# Patient Record
Sex: FEMALE | Race: WHITE | NOT HISPANIC OR LATINO | URBAN - METROPOLITAN AREA
[De-identification: names, ages, dates, MRNs, and addresses within clinical notes are randomized per-mention and may not be internally consistent; named-entity substitution may affect disease eponyms.]

---

## 2023-11-21 DIAGNOSIS — F32.A DEPRESSION, UNSPECIFIED DEPRESSION TYPE: Primary | ICD-10-CM

## 2023-11-21 RX ORDER — ESCITALOPRAM OXALATE 10 MG/1
10 TABLET ORAL DAILY
Qty: 90 TABLET | Refills: 0 | Status: SHIPPED | OUTPATIENT
Start: 2023-11-21 | End: 2024-02-07

## 2023-11-27 ENCOUNTER — TELEPHONE (OUTPATIENT)
Dept: GASTROENTEROLOGY | Facility: HOSPITAL | Age: 68
End: 2023-11-27
Payer: COMMERCIAL

## 2023-11-27 DIAGNOSIS — R74.8 ELEVATED LIVER ENZYMES: Primary | ICD-10-CM

## 2023-11-27 NOTE — TELEPHONE ENCOUNTER
Hepatology Nurse Coordinator Note  Called patient to let her know that Dr. Flores would like her to get blood work completed prior to her upcoming follow up appointment. Patient states she is at [facility] [employer] now and would like the orders faxed to them at [phone]. Orders faxed. She verbalized understanding of plan and aware to call back if any questions, or concerns.     Update: Received fax receipt confirmation from [employer].

## 2023-11-27 NOTE — TELEPHONE ENCOUNTER
----- Message from Fabiano Flores MD sent at 11/27/2023 10:39 AM EST -----  Regarding: Call pt regarding labs prior to visit  Can you let this pt know that she can have LFT check done prior to her follow up visit with me. Order has been placed. Thanks

## 2023-12-01 ENCOUNTER — TELEPHONE (OUTPATIENT)
Dept: GASTROENTEROLOGY | Facility: HOSPITAL | Age: 68
End: 2023-12-01
Payer: COMMERCIAL

## 2023-12-06 ENCOUNTER — OFFICE VISIT (OUTPATIENT)
Dept: GASTROENTEROLOGY | Facility: HOSPITAL | Age: 68
End: 2023-12-06
Payer: COMMERCIAL

## 2023-12-06 VITALS
RESPIRATION RATE: 20 BRPM | TEMPERATURE: 97.6 F | BODY MASS INDEX: 31.24 KG/M2 | HEIGHT: 65 IN | OXYGEN SATURATION: 94 % | WEIGHT: 187.5 LBS | DIASTOLIC BLOOD PRESSURE: 74 MMHG | SYSTOLIC BLOOD PRESSURE: 120 MMHG | HEART RATE: 72 BPM

## 2023-12-06 DIAGNOSIS — R74.8 ELEVATED LIVER ENZYMES: Primary | ICD-10-CM

## 2023-12-06 DIAGNOSIS — K75.81 NASH (NONALCOHOLIC STEATOHEPATITIS): ICD-10-CM

## 2023-12-06 PROCEDURE — 1036F TOBACCO NON-USER: CPT | Performed by: STUDENT IN AN ORGANIZED HEALTH CARE EDUCATION/TRAINING PROGRAM

## 2023-12-06 PROCEDURE — 99214 OFFICE O/P EST MOD 30 MIN: CPT | Performed by: STUDENT IN AN ORGANIZED HEALTH CARE EDUCATION/TRAINING PROGRAM

## 2023-12-06 PROCEDURE — 1159F MED LIST DOCD IN RCRD: CPT | Performed by: STUDENT IN AN ORGANIZED HEALTH CARE EDUCATION/TRAINING PROGRAM

## 2023-12-06 PROCEDURE — 1126F AMNT PAIN NOTED NONE PRSNT: CPT | Performed by: STUDENT IN AN ORGANIZED HEALTH CARE EDUCATION/TRAINING PROGRAM

## 2023-12-06 PROCEDURE — 3008F BODY MASS INDEX DOCD: CPT | Performed by: STUDENT IN AN ORGANIZED HEALTH CARE EDUCATION/TRAINING PROGRAM

## 2023-12-06 RX ORDER — PANTOPRAZOLE SODIUM 40 MG/1
40 TABLET, DELAYED RELEASE ORAL DAILY
COMMUNITY
End: 2024-02-23

## 2023-12-06 RX ORDER — BENZONATATE 200 MG/1
200 CAPSULE ORAL 3 TIMES DAILY PRN
COMMUNITY
Start: 2023-05-02 | End: 2024-01-15 | Stop reason: ALTCHOICE

## 2023-12-06 RX ORDER — NICOTINE POLACRILEX 4 MG
LOZENGE BUCCAL
COMMUNITY
Start: 2023-05-02 | End: 2024-01-15 | Stop reason: ALTCHOICE

## 2023-12-06 RX ORDER — LOSARTAN POTASSIUM 25 MG/1
25 TABLET ORAL DAILY
COMMUNITY
End: 2024-02-07

## 2023-12-06 RX ORDER — LEVOTHYROXINE SODIUM 50 UG/1
1 TABLET ORAL DAILY
COMMUNITY
Start: 2013-12-10 | End: 2024-03-08

## 2023-12-06 RX ORDER — AZELASTINE 1 MG/ML
1 SPRAY, METERED NASAL 2 TIMES DAILY
COMMUNITY
Start: 2021-05-24 | End: 2024-06-10

## 2023-12-06 RX ORDER — FLUTICASONE PROPIONATE 50 MCG
SPRAY, SUSPENSION (ML) NASAL
COMMUNITY
Start: 2013-03-01

## 2023-12-06 RX ORDER — MONTELUKAST SODIUM 10 MG/1
1 TABLET ORAL NIGHTLY
COMMUNITY
Start: 2014-01-20 | End: 2024-01-15 | Stop reason: ALTCHOICE

## 2023-12-06 RX ORDER — VERAPAMIL HYDROCHLORIDE 240 MG/1
1 TABLET, FILM COATED, EXTENDED RELEASE ORAL DAILY
COMMUNITY
Start: 2006-06-15 | End: 2024-02-22

## 2023-12-06 ASSESSMENT — LIFESTYLE VARIABLES
HOW MANY STANDARD DRINKS CONTAINING ALCOHOL DO YOU HAVE ON A TYPICAL DAY: PATIENT DOES NOT DRINK
SKIP TO QUESTIONS 9-10: 1
HOW OFTEN DO YOU HAVE A DRINK CONTAINING ALCOHOL: NEVER
AUDIT-C TOTAL SCORE: 0
HOW OFTEN DO YOU HAVE SIX OR MORE DRINKS ON ONE OCCASION: NEVER

## 2023-12-06 ASSESSMENT — PAIN SCALES - GENERAL: PAINLEVEL: 0-NO PAIN

## 2023-12-06 ASSESSMENT — ENCOUNTER SYMPTOMS
DEPRESSION: 0
OCCASIONAL FEELINGS OF UNSTEADINESS: 0
LOSS OF SENSATION IN FEET: 0

## 2023-12-06 ASSESSMENT — PATIENT HEALTH QUESTIONNAIRE - PHQ9
SUM OF ALL RESPONSES TO PHQ9 QUESTIONS 1 AND 2: 0
2. FEELING DOWN, DEPRESSED OR HOPELESS: NOT AT ALL
1. LITTLE INTEREST OR PLEASURE IN DOING THINGS: NOT AT ALL

## 2023-12-06 NOTE — PATIENT INSTRUCTIONS
Fauzia Nelson,     Thank you for coming in for your hepatology office visit today. As per our discussion, I recommend:     Have labs done in end of March 2024.   Have a fibroscan done in April/May 2024. Call [phone].   Discuss with Dr Alejandro about starting medication for weight loss and fatty liver ds- since your liver tests are still elevated.     I would like to see you back in the office in May/June 2024.   If you are not provided with a date for your follow up visit at the end of your encounter today at the check out desk, I would encourage you to call [phone] to schedule your appointment with me.   If you have any trouble or need assistance with having this done, please reach out to my 's office at [phone] (Ms Joseline Mccloud) or my nurse coordinator at [phone] (Ms Glenis DAVIS).     I look forward to seeing you again. Thank you for allowing me to participate in your care.     Sincerely,  Fabiano Flores MD  Hepatology

## 2023-12-06 NOTE — Clinical Note
Dr Alejandro- NINO met with Ms Nelson yesterday. She is interested in exploring pharmacotherapy that would target weight loss and also help HANCOCK. Her LFTs remain modestly elevated and I do think she would benefit from the same. I have asked her to reach out to your office/schedule a visit with you to discuss this. Appreciate your help with this.

## 2023-12-07 PROBLEM — R74.8 ELEVATED LIVER ENZYMES: Status: ACTIVE | Noted: 2023-12-07

## 2023-12-07 PROBLEM — K75.81 NASH (NONALCOHOLIC STEATOHEPATITIS): Status: ACTIVE | Noted: 2023-12-07

## 2023-12-07 NOTE — PROGRESS NOTES
Hepatology: Follow up Office Note     Patient: Fauzia Nelson, a 68 y.o. year old female presents for follow up of elevated liver enzymes and steatotic liver disease.   PCP: Emory Alejandro DO    History of Present Illness   PMH: Hypertension, dyslipidemia, hypothyroidism.     Prior visit hx: Has had history of elevated LFTs for years and reports having prior follow up at [facility]. She switched insurance providers and as a result if switching health care providers as well. Initially there was a debate about her diagnosis between HANCOCK versus autoimmune hepatitis given positive VANESSA. She reportedly had improvement of liver enzymes on a prednisone trial and was subsequently transitioned to Imuran. Imuran however was later discontinued. She underwent a liver biopsy last year after she was off imuran. She notes being diagnosed with stage 2 fatty liver disease.      Today's visit: She feels well. Denies having symptoms- no RUQ abdominal pain, nausea, vomiting, itching, jaundice, abdominal pain, rashes. She notes trying to stay consistent with her food modifications. Restricting her caloric intake to 1200 kCal a day. Occasional bouts of low energy. Eats less red meats. Trying to eat more vegetable, chicken. Weight has been stable, but no weight loss.       PSH: Denies intra-abdominal surgical history  Social history: Denies history of alcohol, illicit substance or smoking.  Family history: No known history of chronic liver disease or hepatobiliary malignancy.  Medications: noted and reviewed    Review of Systems   Constitutional/ General: No fever, no night sweats, no weight loss  Eyes: no yellow discoloration  ENT: normal   Cardiovascular: no chest pain, no palpitations  Respiratory: no shortness of breath  Gastrointestinal: denies abdominal pain, nausea, vomiting  Integumentary: no rashes, no yellow discoloration of skin  Neurologic: no weakness or numbness of extremities  Psychiatric: no mood  fluctuations  Musculoskeletal: no joint swelling   Genitourinary: no dysuria, no hematuria    All other systems have been reviewed and are negative except as noted in the HPI and above.    Medications     Current Outpatient Medications   Medication Instructions    azelastine (Astelin) 137 mcg (0.1 %) nasal spray 1 spray, nasal, 2 times daily    benzonatate (TESSALON) 200 mg, oral, 3 times daily PRN    escitalopram (LEXAPRO) 10 mg, oral, Daily    fluticasone (Flonase) 50 mcg/actuation nasal spray USE 1 TO 2 SPRAYS IN EACH NOSTRIL ONE TIME DAILY    levothyroxine (Synthroid, Levoxyl) 50 mcg tablet 1 tablet, oral, Daily    losartan (COZAAR) 25 mg, oral, Daily    montelukast (Singulair) 10 mg tablet 1 tablet, oral, Nightly    pantoprazole (PROTONIX) 40 mg, oral, Daily    Tussin DM Day-Night 12.5 mg-30 mg/ 10 mL (night) liquid, sequential TAKE AS INSTRUCTED ON BOTTLE    verapamil SR (Calan-SR) 240 mg ER tablet 1 tablet, oral, Daily         Physical Examination     Vitals:    12/06/23 1554   BP: 120/74   Pulse: 72   Resp: 20   Temp: 36.4 °C (97.6 °F)   SpO2: 94%     Vitals:    12/06/23 1554   Weight: 85 kg (187 lb 8 oz)     Body mass index is 31.2 kg/m².  Constitutional: awake, alert   Eyes: EOMI, anicteric sclera  ENT: no oropharyngeal lesions visualized  Respiratory: Bilateral air entry equal no wheezing  Cardiovascular: regular rate and rhythm, no lower extremity edema  Abdomen: soft, non tender, non distended, bowel sounds present  Integumentary: no wounds on examined skin   Musculoskeletal: no joint swelling   Neurologic: gross motor strength intact  Psychiatric: alert, appropriate mood and affect, oriented to time/place/person    Labs     Lab Results   Component Value Date     03/02/2021    K 3.3 (L) 03/02/2021    CREATININE 0.98 03/02/2021    ALBUMIN 4.8 03/02/2021    ALT 72 (H) 03/02/2021    AST 43 (H) 03/02/2021    ALKPHOS 60 03/02/2021    INR 1.0 03/02/2021    HGB 16.3 (H) 03/02/2021     03/02/2021       Dec 2023: , AST 62, T bili 0.6, ALP 77  June 2023: ALT 79, AST 49, ALP 64, Bili 0.7, INR 1.1, Hb 15.9, WBC 5.7, Plt 261, ELF 9.51 (low)    Imaging   US liver June 2023: Hepatic steatosis.     HIE records  Labs: October/Sept 2022: INR 1.14, platelets 209, hemoglobin 15.1, sodium 136, creatinine 0.99, , AST 82, ALP 63, bilirubin 0.8, TSH 3.04  July 2021: AFP 2.3, ALT 77, AST 48, ALP 57, bilirubin 0.9  August 2020: ASMA negative, HCV antibody nonreactive, IgG 1270, VANESSA negative AMA negative, anti-LKM neg  2017: Ceruloplasmin 24, SSA antibody positive, ferritin 170, iron 70, percent saturation 25, A1 AT 83/low, hep B surface antibody 16.5, hep B surface antigen nonreactive, hep A total antibody nonreactive, hep B core total antibody nonreactive, hep C antibody nonreactive     October 2022: Ultrasound-guided liver biopsy: [facility]  Imaging:/Gallbladder/pancreas: July 2021: Mild hepatic steatosis, no focal suspicious hepatic lesion     Office note November 14, 2022: She presented for follow-up visit for management of HANCOCK. Liver biopsy was done in October 2022. She underwent a prednisone trial in the past and had VANESSA elevation of 1: 320 in the past. She was prescribed Imuran and advised to continue until further work-up. Subsequently Imuran was discontinued and autoimmune labs were negative.  Liver biopsy to stage fibrosis suspicious for HANCOCK with grade 1B fibrosis. Metabolic risk factors include obesity, dyslipidemia, hypertension, A1c normal, lipid panel only slightly abnormal, hypertension controlled. Continues to work on losing weight.  July 2021: FibroScan minimal fibrosis F2 with grade 3 steatosis. LSM 7.7K PA.     Liver biopsy report as documented in the HIE note: Moderate macrovesicular steatosis involving 50% hepatocytes with moderate lobular inflammation, ballooning degeneration hepatocytes and mild periportal and sinusoidal fibrosis. Fibrosis stage Ib, zone 3/perisinusoidal. Comment:  Overall features are suspicious for HANCOCK.    Assessment and Plan    Fauzia Nelson, a 68 y.o. year old female presents for follow up of HANCOCK/MASH. I have reviewed pertinent provider notes, labs and imaging studies. Discussed results and their interpretation with the patient today.    Encounter Diagnoses   Name Primary?    Elevated liver enzymes Yes    HANCOCK (nonalcoholic steatohepatitis)     Adult BMI 31.0-31.9 kg/sq m      Orders Placed This Encounter   Procedures    Hepatic Function Panel    CBC and Auto Differential    Liver Elastography (Fibroscan) GI      # Based on review of records available through [facility] at the time of her initial visit: she has been diagnosed with HANCOCK with stage 1b fibrosis on liver biopsy in Oct 2022. She has had ALT and AST elevations up to 4-6 xULN.   Prior to this, it was debated if she had a component of autoimmune hepatitis given positive VANESSA and a response to prednisone trial (based on chart documentation). The liver biopsy based on the report documented does not suggest feature of autoimmune liver ds.   She does have risk factors for metabolic liver disease; with diagnosis of HTN, dyslipidemia, abdominal adiposity.      # Today's visit: Continues to have modest transaminase elevations on LFTs, ALT predominant.   ELF score at the time of last visit predicted low risk for hepatic decompensation.   Given the persistent elevation of LFTs, discussed with pt about pursuing secondary therapy for MASH- including pharmacotherapy. Discussed options on how this can be done. Pt is interested in reviewing this with her PCP, Dr Alejandro. Pending his assessment and recs- pt will reach out to me. Also offered referral to endocrinology or [facility] program for discussion of pharmacotherapy for weight loss/MASH.     Discussed with patient pathophysiology of metabolism associated steatotic liver disease, MASLD/MASH. Discussed potential of disease progression to steatohepatitis, liver fibrosis, cirrhosis  and risk of HCC.     - Plan for LFT monitoring every 4 months.  - Plan for FibroSCAN in April/May 2024.   - Congratulated pt on maintaining her weight in a stable range over the past year. Discussed on continuing dietary modification. However, cautioned pt against restricting caloric intake to 0401-7688 kCal a day. Discussed that could be very restrictive and also add to fatigue etc. Counseled pt to liberalize her food intake to include 50% vegetables (and fiber from 1-2 servings of fruit), 30% lean protein and 20% carbs and fats.        - She has seen a nutritionist in July 2023.   - Pt advised to continue to avoid hepatotoxic agents.      Follow up in 6 months.

## 2024-01-04 ENCOUNTER — APPOINTMENT (OUTPATIENT)
Dept: PRIMARY CARE | Facility: CLINIC | Age: 69
End: 2024-01-04
Payer: COMMERCIAL

## 2024-01-15 ENCOUNTER — OFFICE VISIT (OUTPATIENT)
Dept: PRIMARY CARE | Facility: CLINIC | Age: 69
End: 2024-01-15
Payer: COMMERCIAL

## 2024-01-15 VITALS
HEIGHT: 65 IN | SYSTOLIC BLOOD PRESSURE: 136 MMHG | OXYGEN SATURATION: 97 % | TEMPERATURE: 97.8 F | WEIGHT: 192 LBS | HEART RATE: 74 BPM | DIASTOLIC BLOOD PRESSURE: 82 MMHG | BODY MASS INDEX: 31.99 KG/M2

## 2024-01-15 DIAGNOSIS — E66.9 OBESITY (BMI 30-39.9): ICD-10-CM

## 2024-01-15 DIAGNOSIS — E03.8 OTHER SPECIFIED HYPOTHYROIDISM: ICD-10-CM

## 2024-01-15 DIAGNOSIS — Z00.00 ENCOUNTER FOR HEALTH MAINTENANCE EXAMINATION: Primary | ICD-10-CM

## 2024-01-15 DIAGNOSIS — Z13.220 LIPID SCREENING: ICD-10-CM

## 2024-01-15 DIAGNOSIS — E03.9 HYPOTHYROIDISM, UNSPECIFIED TYPE: ICD-10-CM

## 2024-01-15 PROCEDURE — 3008F BODY MASS INDEX DOCD: CPT | Performed by: STUDENT IN AN ORGANIZED HEALTH CARE EDUCATION/TRAINING PROGRAM

## 2024-01-15 PROCEDURE — 1036F TOBACCO NON-USER: CPT | Performed by: STUDENT IN AN ORGANIZED HEALTH CARE EDUCATION/TRAINING PROGRAM

## 2024-01-15 PROCEDURE — 99214 OFFICE O/P EST MOD 30 MIN: CPT | Performed by: STUDENT IN AN ORGANIZED HEALTH CARE EDUCATION/TRAINING PROGRAM

## 2024-01-15 PROCEDURE — 1126F AMNT PAIN NOTED NONE PRSNT: CPT | Performed by: STUDENT IN AN ORGANIZED HEALTH CARE EDUCATION/TRAINING PROGRAM

## 2024-01-15 PROCEDURE — 1159F MED LIST DOCD IN RCRD: CPT | Performed by: STUDENT IN AN ORGANIZED HEALTH CARE EDUCATION/TRAINING PROGRAM

## 2024-01-15 NOTE — PROGRESS NOTES
"Subjective   Patient ID: Fuazia Nelson is a 68 y.o. female who presents for Weight Loss (Discuss weight loss meds and review labs/discuss labs).    HPI  HANCOCK  History of HANCOCK  Follows with hepatology and GI  Does not have history of diabetes  Was sent to PCP by GI for weight loss medication      Review of Systems  All pertinent positive symptoms are included in the history of present illness.    All other systems have been reviewed and are negative and noncontributory to this patient's current ailments.     Allergies   Allergen Reactions    Butalbital-Acetaminop-Caf-Cod Nausea And Vomiting and Nausea Only    Codeine GI Upset, Other and Unknown    Lisinopril Cough    Conrad Hives     Penicillin    Penicillins Hives, Nausea And Vomiting, Nausea Only, Rash and Unknown    Sulfa (Sulfonamide Antibiotics) Hives and Rash        Immunization History   Administered Date(s) Administered    Pfizer Purple Cap SARS-CoV-2 03/10/2021, 04/01/2021, 12/22/2021       Objective   Vitals:    01/15/24 1312   BP: 136/82   Pulse: 74   Temp: 36.6 °C (97.8 °F)   SpO2: 97%   Weight: 87.1 kg (192 lb)   Height: 1.651 m (5' 5\")       Physical Exam  CONSTITUTIONAL - well nourished, well developed, looks like stated age, in no acute distress, not ill-appearing, and not tired appearing  SKIN - normal skin color and pigmentation, normal skin turgor without rash, lesions, or nodules visualized  HEAD - no trauma, normocephalic  EYES - normal external exam  NECK - supple without rigidity, no neck mass was observed, no thyromegaly or thyroid nodules  CHEST - clear to auscultation, no wheezing, no crackles and no rales, good effort  CARDIAC - regular rate and regular rhythm, no skipped beats, no murmur  EXTREMITIES - no edema, no deformities  NEUROLOGICAL - normal gait, normal balance, normal motor, no ataxia, alert, oriented and no focal signs  PSYCHIATRIC - alert, pleasant and cordial, age-appropriate  IMMUNOLOGIC - no cervical lymphadenopathy "     Assessment/Plan   HANCOCK  Continue healthy diet and exercise  Due to medication shortages and lack of insulin resistance, there will be difficulty getting GLP-1s covered

## 2024-02-07 DIAGNOSIS — F32.A DEPRESSION, UNSPECIFIED DEPRESSION TYPE: ICD-10-CM

## 2024-02-07 DIAGNOSIS — I10 BENIGN ESSENTIAL HTN: Primary | ICD-10-CM

## 2024-02-07 RX ORDER — ESCITALOPRAM OXALATE 10 MG/1
10 TABLET ORAL DAILY
Qty: 90 TABLET | Refills: 1 | Status: SHIPPED | OUTPATIENT
Start: 2024-02-07 | End: 2024-06-03

## 2024-02-07 RX ORDER — LOSARTAN POTASSIUM 25 MG/1
25 TABLET ORAL DAILY
Qty: 90 TABLET | Refills: 1 | Status: SHIPPED | OUTPATIENT
Start: 2024-02-07 | End: 2024-06-03

## 2024-02-22 DIAGNOSIS — I10 HYPERTENSION, UNSPECIFIED TYPE: Primary | ICD-10-CM

## 2024-02-22 RX ORDER — VERAPAMIL HYDROCHLORIDE 240 MG/1
240 TABLET, FILM COATED, EXTENDED RELEASE ORAL DAILY
Qty: 90 TABLET | Refills: 3 | Status: SHIPPED | OUTPATIENT
Start: 2024-02-22

## 2024-02-23 DIAGNOSIS — K21.9 GASTROESOPHAGEAL REFLUX DISEASE, UNSPECIFIED WHETHER ESOPHAGITIS PRESENT: Primary | ICD-10-CM

## 2024-02-23 DIAGNOSIS — T78.40XS ALLERGY, SEQUELA: ICD-10-CM

## 2024-02-23 RX ORDER — PANTOPRAZOLE SODIUM 40 MG/1
40 TABLET, DELAYED RELEASE ORAL DAILY
Qty: 90 TABLET | Refills: 3 | Status: SHIPPED | OUTPATIENT
Start: 2024-02-23

## 2024-02-23 RX ORDER — MONTELUKAST SODIUM 10 MG/1
10 TABLET ORAL DAILY
Qty: 90 TABLET | Refills: 3 | Status: SHIPPED | OUTPATIENT
Start: 2024-02-23

## 2024-03-06 ENCOUNTER — CLINICAL SUPPORT (OUTPATIENT)
Dept: GASTROENTEROLOGY | Facility: HOSPITAL | Age: 69
End: 2024-03-06
Payer: COMMERCIAL

## 2024-03-06 DIAGNOSIS — R74.8 ELEVATED LIVER ENZYMES: ICD-10-CM

## 2024-03-06 DIAGNOSIS — K75.81 NASH (NONALCOHOLIC STEATOHEPATITIS): ICD-10-CM

## 2024-03-06 PROCEDURE — 91200 LIVER ELASTOGRAPHY: CPT | Performed by: STUDENT IN AN ORGANIZED HEALTH CARE EDUCATION/TRAINING PROGRAM

## 2024-03-08 DIAGNOSIS — E03.9 HYPOTHYROIDISM, UNSPECIFIED TYPE: Primary | ICD-10-CM

## 2024-03-08 RX ORDER — LEVOTHYROXINE SODIUM 50 UG/1
50 TABLET ORAL DAILY
Qty: 90 TABLET | Refills: 2 | Status: SHIPPED | OUTPATIENT
Start: 2024-03-08

## 2024-03-15 ENCOUNTER — TELEPHONE (OUTPATIENT)
Dept: GASTROENTEROLOGY | Facility: CLINIC | Age: 69
End: 2024-03-15
Payer: COMMERCIAL

## 2024-03-15 NOTE — TELEPHONE ENCOUNTER
Addendum 3/15/2024 3:37 PM  Called patient to provide results, as well as to return call regarding questions patient had (regarding lab orders and fibroscan results). Patient was provided Fibroscan results, as well as education on the FibroSCAN fibrosis and steatosis scoring. Patient is aware her fibroscan predicted stage 2 fibrosis and grade 1 steatosis. She's aware this is stable in comparison to her fibroscan in 2021.     Patient is aware she needs to get her blood work done. Patient is requesting for orders to be mailed to her as she needs to go to [place]. Verified mailing address.     Asked patient if she had discussed pharmacotherapy for weight management with her PCP, which would be beneficial for HANCOCK. Patient states she spoke with Dr. Martinez and is considering either Wegovy, Ozempic, or Mounjaro, but she hasn't decided yet. She's aware to follow up with her PCP for orders, once she has decided. She verbalized understanding. Will update Dr. Flores on status.    Patient verbalized understanding of results and recommendations.      ----- Message from Fabiano Flores MD sent at 3/7/2024 10:45 AM EST -----  Please advise the pt that Fibroscan predicts Stage 2 fibrosis and grade 1 steatosis. The fibrosis result is stable in comparison to her prior fibroscan in 2021. Pt will be due for labs for me this month, orders are in from the office visit in Dec. In addition would check if she had a discussion with her PCP regarding pharmacotherapy for weight mgmt which would be beneficial for HANCOCK as well. Thanks

## 2024-04-18 ENCOUNTER — TELEPHONE (OUTPATIENT)
Dept: PRIMARY CARE | Facility: CLINIC | Age: 69
End: 2024-04-18
Payer: COMMERCIAL

## 2024-04-18 DIAGNOSIS — Z12.11 COLON CANCER SCREENING: Primary | ICD-10-CM

## 2024-04-18 NOTE — TELEPHONE ENCOUNTER
Patient called stating that one of her old doctors at [facility] had written for her to have a cologuard screening.  She recently did it and it came back positive.  Do you want her to come in to see you to have you write the order for a colonoscopy?  Or would you be okay with writing an order for a colonoscopy for her?

## 2024-05-20 NOTE — PROGRESS NOTES
Hepatology: Follow up Office Note     Patient: Fauzia Nelson, a 68 y.o. year old female presents for follow up of elevated liver enzymes and steatotic liver disease.   PCP: Emory Alejandro DO    History of Present Illness   PMH: Hypertension, dyslipidemia, hypothyroidism.     Prior visit hx: Has had history of elevated LFTs for years and reports having prior follow up at [facility]. She switched insurance providers and as a result if switching health care providers as well. Initially there was a debate about her diagnosis between HANCOCK versus autoimmune hepatitis given positive VANESSA. She reportedly had improvement of liver enzymes on a prednisone trial and was subsequently transitioned to Imuran. Imuran however was later discontinued. She underwent a liver biopsy last year after she was off imuran. She notes being diagnosed with stage 2 fatty liver disease.      Today's visit: Here for a follow up visit. Denies acute symptoms. Feels well. Continues to adhere to nutritional modifications for hx of fatty liver ds. She feels well. Denies having symptoms- no RUQ abdominal pain, nausea, vomiting, itching, jaundice, abdominal pain, rashes. She notes trying to stay consistent with her food modifications. Restricting her caloric intake to 1200 kCal a day. Occasional bouts of low energy. Eats less red meats. Trying to eat more vegetable, chicken. Weight has been stable, but no weight loss.       PSH: Denies intra-abdominal surgical history  Social history: Denies history of alcohol, illicit substance or smoking.  Family history: No known history of chronic liver disease or hepatobiliary malignancy.  Medications: noted and reviewed    Review of Systems   Constitutional/ General: No fever, no night sweats, no weight loss  Eyes: no yellow discoloration  ENT: normal   Cardiovascular: no chest pain, no palpitations  Respiratory: no shortness of breath  Gastrointestinal: denies abdominal pain, nausea, vomiting  Integumentary: no  rashes, no yellow discoloration of skin  Neurologic: no weakness or numbness of extremities  Psychiatric: no mood fluctuations  Musculoskeletal: no joint swelling   Genitourinary: no dysuria, no hematuria    All other systems have been reviewed and are negative except as noted in the HPI and above.    Medications     Current Outpatient Medications   Medication Instructions    azelastine (Astelin) 137 mcg (0.1 %) nasal spray 1 spray, nasal, 2 times daily    escitalopram (LEXAPRO) 10 mg, oral, Daily    fluticasone (Flonase) 50 mcg/actuation nasal spray USE 1 TO 2 SPRAYS IN EACH NOSTRIL ONE TIME DAILY    levothyroxine (SYNTHROID, LEVOXYL) 50 mcg, oral, Daily    losartan (COZAAR) 25 mg, oral, Daily    montelukast (SINGULAIR) 10 mg, oral, Daily    pantoprazole (PROTONIX) 40 mg, oral, Daily    verapamil SR (CALAN-SR) 240 mg, oral, Daily         Physical Examination     Vitals:    05/22/24 1526   BP: 100/64   Pulse: 72   Resp: 18   Temp: 36.5 °C (97.7 °F)   SpO2: 94%     Vitals:    05/22/24 1526   Weight: 85.7 kg (189 lb)     Body mass index is 31.45 kg/m².    Constitutional: awake, alert   Eyes: EOMI, anicteric sclera  ENT: no oropharyngeal lesions visualized  Respiratory: Bilateral air entry equal no wheezing  Cardiovascular: regular rate and rhythm, no lower extremity edema  Abdomen: soft, non tender, non distended, bowel sounds present  Integumentary: no wounds on examined skin   Musculoskeletal: no joint swelling   Neurologic: gross motor strength intact  Psychiatric: alert, appropriate mood and affect, oriented to time/place/person    Labs     March 2024: ALT 65, AST 42  Dec 2023: , AST 62, T bili 0.6, ALP 77  June 2023: ALT 79, AST 49, ALP 64, Bili 0.7, INR 1.1, Hb 15.9, WBC 5.7, Plt 261, ELF 9.51 (low)    Lab Results   Component Value Date     03/02/2021    K 3.3 (L) 03/02/2021    CREATININE 0.98 03/02/2021    ALBUMIN 4.8 03/02/2021    ALT 72 (H) 03/02/2021    AST 43 (H) 03/02/2021    ALKPHOS 60  03/02/2021    INR 1.0 03/02/2021    HGB 16.3 (H) 03/02/2021     03/02/2021      Imaging   Fibroscan March 2024: E 7.7 kPa, IQR 17%, . F2, S1.     US liver June 2023: Hepatic steatosis.     HIE records  Labs: October/Sept 2022: INR 1.14, platelets 209, hemoglobin 15.1, sodium 136, creatinine 0.99, , AST 82, ALP 63, bilirubin 0.8, TSH 3.04  July 2021: AFP 2.3, ALT 77, AST 48, ALP 57, bilirubin 0.9  August 2020: ASMA negative, HCV antibody nonreactive, IgG 1270, VANESSA negative AMA negative, anti-LKM neg  2017: Ceruloplasmin 24, SSA antibody positive, ferritin 170, iron 70, percent saturation 25, A1 AT 83/low, hep B surface antibody 16.5, hep B surface antigen nonreactive, hep A total antibody nonreactive, hep B core total antibody nonreactive, hep C antibody nonreactive     October 2022: Ultrasound-guided liver biopsy: [facility]  Imaging:/Gallbladder/pancreas: July 2021: Mild hepatic steatosis, no focal suspicious hepatic lesion     Office note November 14, 2022: She presented for follow-up visit for management of HANCOCK. Liver biopsy was done in October 2022. She underwent a prednisone trial in the past and had VANESSA elevation of 1: 320 in the past. She was prescribed Imuran and advised to continue until further work-up. Subsequently Imuran was discontinued and autoimmune labs were negative.  Liver biopsy to stage fibrosis suspicious for HANCOCK with grade 1B fibrosis. Metabolic risk factors include obesity, dyslipidemia, hypertension, A1c normal, lipid panel only slightly abnormal, hypertension controlled. Continues to work on losing weight.  July 2021: FibroScan minimal fibrosis F2 with grade 3 steatosis. LSM 7.7K PA.     Liver biopsy report as documented in the HIE note: Moderate macrovesicular steatosis involving 50% hepatocytes with moderate lobular inflammation, ballooning degeneration hepatocytes and mild periportal and sinusoidal fibrosis. Fibrosis stage Ib, zone 3/perisinusoidal. Comment:  Overall features are suspicious for HANCOCK.    Assessment and Plan    Fauzia Nelson, a 68 y.o. year old female presents for follow up of HANCCOK/MASH. I have reviewed pertinent provider notes, labs and imaging studies. Discussed results and their interpretation with the patient today.    Encounter Diagnoses   Name Primary?    HANCOCK (nonalcoholic steatohepatitis) Yes    Elevated liver enzymes     Adult BMI 31.0-31.9 kg/sq m        Orders Placed This Encounter   Procedures    US abdomen limited liver    CBC and Auto Differential    Basic Metabolic Panel    Hepatic Function Panel    Protime-INR    Enhanced Liver Fibrosis Test (ELF); [facility]; [id number] - Miscellaneous Test      # [facility] stage 1b fibrosis based on liver bx in Oct 2022.   [Based on review of records available through [facility] at the time of her initial visit: she has been diagnosed with HANCOCK with stage 1b fibrosis on liver biopsy in Oct 2022. She has had ALT and AST elevations up to 4-6 xULN. Prior to this, it was debated if she had a component of autoimmune hepatitis given positive VANESSA and a response to prednisone trial (based on chart documentation). The liver biopsy based on the report documented does not suggest feature of autoimmune liver ds.]  Cardio metabolic risk factors present: HTN, dyslipidemia, abdominal adiposity, elevated BMI.    -Subsequent NIT with VCE estimates stage II disease for MASLD. The degree of LSM is stable on recent FibroSCAN.   -Continued elevation in liver enzymes, degree of elevations are stable. But this continues to suggest disease activity.     With stage II disease/fibrosis: recommend pharmacotherapy- Resmetirom. Discussed details of this pharmacotherapy with the pt today.   Pt is interested in reviewing this- provided information for the drug for patient to review.       - Plan for labs now- including LFT, CBC, BMP, INR and ELF.   - Recommend US liver to monitor hepatic morphology.   - Pending labs will discuss if pt is agreeable to  move forward with trial of Resmetirom. Short term follow up in 3 months.   - She has seen a nutritionist in July 2023.   - Pt advised to continue to avoid hepatotoxic agents.      Follow up in 3 months.

## 2024-05-22 ENCOUNTER — OFFICE VISIT (OUTPATIENT)
Dept: GASTROENTEROLOGY | Facility: HOSPITAL | Age: 69
End: 2024-05-22
Payer: COMMERCIAL

## 2024-05-22 VITALS
OXYGEN SATURATION: 94 % | HEART RATE: 72 BPM | DIASTOLIC BLOOD PRESSURE: 64 MMHG | BODY MASS INDEX: 31.45 KG/M2 | WEIGHT: 189 LBS | SYSTOLIC BLOOD PRESSURE: 100 MMHG | TEMPERATURE: 97.7 F | RESPIRATION RATE: 18 BRPM

## 2024-05-22 DIAGNOSIS — K75.81 NASH (NONALCOHOLIC STEATOHEPATITIS): Primary | ICD-10-CM

## 2024-05-22 DIAGNOSIS — R74.8 ELEVATED LIVER ENZYMES: ICD-10-CM

## 2024-05-22 PROCEDURE — 1126F AMNT PAIN NOTED NONE PRSNT: CPT | Performed by: STUDENT IN AN ORGANIZED HEALTH CARE EDUCATION/TRAINING PROGRAM

## 2024-05-22 PROCEDURE — 1036F TOBACCO NON-USER: CPT | Performed by: STUDENT IN AN ORGANIZED HEALTH CARE EDUCATION/TRAINING PROGRAM

## 2024-05-22 PROCEDURE — 99213 OFFICE O/P EST LOW 20 MIN: CPT | Performed by: STUDENT IN AN ORGANIZED HEALTH CARE EDUCATION/TRAINING PROGRAM

## 2024-05-22 PROCEDURE — 3008F BODY MASS INDEX DOCD: CPT | Performed by: STUDENT IN AN ORGANIZED HEALTH CARE EDUCATION/TRAINING PROGRAM

## 2024-05-22 PROCEDURE — 1159F MED LIST DOCD IN RCRD: CPT | Performed by: STUDENT IN AN ORGANIZED HEALTH CARE EDUCATION/TRAINING PROGRAM

## 2024-05-22 ASSESSMENT — PAIN SCALES - GENERAL: PAINLEVEL: 0-NO PAIN

## 2024-05-22 NOTE — PATIENT INSTRUCTIONS
Fauzia Nelson,     Thank you for coming in for your hepatology office visit today. As per our discussion, I recommend:     Have labs done.  Have an ultrasound of the liver done, call [phone] to schedule.   You can read more about the new medication for fatty liver here- [email or web address]    I would like to see you back in the office in 3 months.   If you are not provided with a date for your follow up visit at the end of your encounter today at the check out desk, I would encourage you to call [phone] to schedule your appointment with me.   If you have any trouble or need assistance with having this done, please reach out to my 's office at [phone] (Ms Joseline Mccloud) or my nurse coordinator at [phone] (Ms Glenis DAVIS).     I look forward to seeing you again. Thank you for allowing me to participate in your care.     Sincerely,  Fabiano Flores MD  Hepatology

## 2024-05-31 DIAGNOSIS — F32.A DEPRESSION, UNSPECIFIED DEPRESSION TYPE: ICD-10-CM

## 2024-05-31 DIAGNOSIS — I10 BENIGN ESSENTIAL HTN: ICD-10-CM

## 2024-06-03 RX ORDER — LOSARTAN POTASSIUM 25 MG/1
25 TABLET ORAL DAILY
Qty: 90 TABLET | Refills: 1 | Status: SHIPPED | OUTPATIENT
Start: 2024-06-03

## 2024-06-03 RX ORDER — ESCITALOPRAM OXALATE 10 MG/1
10 TABLET ORAL DAILY
Qty: 90 TABLET | Refills: 1 | Status: SHIPPED | OUTPATIENT
Start: 2024-06-03

## 2024-06-08 DIAGNOSIS — J30.2 SEASONAL ALLERGIES: Primary | ICD-10-CM

## 2024-06-10 ENCOUNTER — APPOINTMENT (OUTPATIENT)
Dept: RADIOLOGY | Facility: HOSPITAL | Age: 69
End: 2024-06-10
Payer: COMMERCIAL

## 2024-06-10 RX ORDER — AZELASTINE 1 MG/ML
SPRAY, METERED NASAL
Qty: 30 ML | Refills: 1 | Status: SHIPPED | OUTPATIENT
Start: 2024-06-10 | End: 2024-06-12

## 2024-06-12 ENCOUNTER — TELEPHONE (OUTPATIENT)
Dept: PRIMARY CARE | Facility: CLINIC | Age: 69
End: 2024-06-12
Payer: COMMERCIAL

## 2024-06-12 ENCOUNTER — HOSPITAL ENCOUNTER (OUTPATIENT)
Dept: RADIOLOGY | Facility: HOSPITAL | Age: 69
Discharge: HOME | End: 2024-06-12
Payer: COMMERCIAL

## 2024-06-12 DIAGNOSIS — K75.81 NASH (NONALCOHOLIC STEATOHEPATITIS): ICD-10-CM

## 2024-06-12 DIAGNOSIS — J30.2 SEASONAL ALLERGIES: ICD-10-CM

## 2024-06-12 PROCEDURE — 76705 ECHO EXAM OF ABDOMEN: CPT

## 2024-06-12 RX ORDER — AZELASTINE 1 MG/ML
SPRAY, METERED NASAL
Qty: 30 ML | Refills: 4 | Status: SHIPPED | OUTPATIENT
Start: 2024-06-12 | End: 2024-06-13 | Stop reason: SDUPTHER

## 2024-06-13 DIAGNOSIS — T78.40XS ALLERGY, SEQUELA: Primary | ICD-10-CM

## 2024-06-13 DIAGNOSIS — J30.2 SEASONAL ALLERGIES: ICD-10-CM

## 2024-06-13 RX ORDER — AZELASTINE 1 MG/ML
2 SPRAY, METERED NASAL 2 TIMES DAILY
Qty: 30 ML | Refills: 2 | Status: SHIPPED | OUTPATIENT
Start: 2024-06-13

## 2024-06-13 RX ORDER — AZELASTINE 1 MG/ML
SPRAY, METERED NASAL
Qty: 30 ML | Refills: 4 | Status: CANCELLED | OUTPATIENT
Start: 2024-06-13

## 2024-08-01 ENCOUNTER — TELEPHONE (OUTPATIENT)
Dept: PRIMARY CARE | Facility: CLINIC | Age: 69
End: 2024-08-01

## 2024-08-01 ENCOUNTER — OFFICE VISIT (OUTPATIENT)
Dept: PRIMARY CARE | Facility: CLINIC | Age: 69
End: 2024-08-01
Payer: COMMERCIAL

## 2024-08-01 VITALS
WEIGHT: 191 LBS | OXYGEN SATURATION: 97 % | HEIGHT: 65 IN | SYSTOLIC BLOOD PRESSURE: 118 MMHG | DIASTOLIC BLOOD PRESSURE: 78 MMHG | HEART RATE: 94 BPM | BODY MASS INDEX: 31.82 KG/M2 | TEMPERATURE: 98.8 F

## 2024-08-01 DIAGNOSIS — H61.21 IMPACTED CERUMEN OF RIGHT EAR: ICD-10-CM

## 2024-08-01 DIAGNOSIS — J02.9 SORE THROAT: Primary | ICD-10-CM

## 2024-08-01 LAB — POC GROUP A STREP, PCR: NOT DETECTED

## 2024-08-01 RX ORDER — DOXYCYCLINE 100 MG/1
100 CAPSULE ORAL 2 TIMES DAILY
Qty: 14 CAPSULE | Refills: 0 | Status: SHIPPED | OUTPATIENT
Start: 2024-08-01 | End: 2024-08-08

## 2024-08-01 ASSESSMENT — PATIENT HEALTH QUESTIONNAIRE - PHQ9
2. FEELING DOWN, DEPRESSED OR HOPELESS: NOT AT ALL
SUM OF ALL RESPONSES TO PHQ9 QUESTIONS 1 AND 2: 0
1. LITTLE INTEREST OR PLEASURE IN DOING THINGS: NOT AT ALL

## 2024-08-01 NOTE — PROGRESS NOTES
"Subjective   Patient ID: Fauzia Nelson is a 68 y.o. female who presents for No chief complaint on file..  HPI  She is 68 year old female with PMH allergies, hypothyroidism and depression who presented in the office with one week of sore throat especially the left side more then the right side, and dry cough and hard time swallow.  Patient has some tearing from the eyes but endorse that that is not a new symptoms for her. She saw an ophthalmologist who prescribed new glasses and there were suppose to come tomorrow. No sick contact that she noticed.   She has been using her allergy medication with a little relieve but said that overall her symptoms got worse over the course of the week.     Denies new onset sneezing,no ear discharge, ear clogged, headaches, running nose, fever, chills, n/v/d, chest pain, SOB, abdominal pain, urinary symptoms, and lower extremity edema.     Review of Systems  All other systems have been reviewed and are negative.    Visit Vitals  /78   Pulse 94   Temp 37.1 °C (98.8 °F)   Ht 1.651 m (5' 5\")   Wt 86.6 kg (191 lb)   SpO2 97%   BMI 31.78 kg/m²   Smoking Status Never   BSA 1.99 m²       Objective   Physical Exam  General: Alert and oriented. Appears well-nourished and in no acute distress.  Eyes: PERRLA. EOMI.  Head/neck: Normocephalic. Supple.  Nose-erythema of nasal mucosa without discharge.   Ear- right external canal was normal but TM was not able to see due to wax   Left ear was normal external canal and TM.  Mouth-no sore on the mouth, erythemal of the pharyng without exudate. No block of the air way.  Lymphatics: left side cervical lymphadenopathy tender to touch  Respiratory/Thorax: coarse breathing sound on the upper part of the airway. No wheezing or rale.   Cardiovascular: Regular rate and rhythm. No murmurs.  Gastrointestinal: Soft, nontender, nondistended. +BS   Musculoskeletal: ROM intact. No joint swelling. Normal strength   Extremities: Warm and well perfused. No " peripheral edema.  Neurological: No gross neurologic deficits.   Psychological: Appropriate mood and affect.   Skin: No visible rashes or lesions.     Assessment/Plan   She is 68 year old female with PMH of hypothyroidism, allergies, depression who came to the office complaining of sore throat and cough for the past week. Patient mention that the symptoms got worse during this week.     #upper respiratory infection   -rapid strep test was negative   -order doxycycline 100 mg BID  -patient was advised about the side effect of the medication       # right ear wax  -order debrox drop  for 5 days    No red flags. Thank you for letting me be part of the team today.   Follow up in 10-14 days for evaluation of the upper respiratory infection and clean her ears. Let us know if the symptoms get worse.        Problem List Items Addressed This Visit    None  Visit Diagnoses       Sore throat    -  Primary    Relevant Medications    doxycycline (Vibramycin) 100 mg capsule    Other Relevant Orders    POCT Group A Streptococcus, PCR manually resulted (Completed)    Impacted cerumen of right ear        Relevant Medications    carbamide peroxide (Debrox) 6.5 % otic solution            I have personally reviewed all available pertinent labs, imaging, and consult notes with the patient.     All questions and concerns were addressed. Patient verbalizes understanding instructions and agrees with established plan of care.     I discussed the plan with Dr.Zen Yesy Crane MD  Family medicine resident  PGY3

## 2024-08-01 NOTE — PROGRESS NOTES
"Subjective   Patient ID: Fauzia Nelson is a 68 y.o. female who presents for No chief complaint on file..  HPI        Denies new onset headaches, fever, chills, n/v/d, chest pain, SOB, abdominal pain, urinary symptoms, and lower extremity edema.     Review of Systems  All other systems have been reviewed and are negative.    Visit Vitals  /78   Pulse 110   Temp 37.1 °C (98.8 °F)   Ht 1.651 m (5' 5\")   Wt 86.6 kg (191 lb)   SpO2 97%   BMI 31.78 kg/m²   Smoking Status Never   BSA 1.99 m²       Objective   Physical Exam  General: Alert and oriented. Appears well-nourished and in no acute distress.  Eyes: PERRLA. EOMI.  Head/neck: Normocephalic. Supple.  Lymphatics: No cervical lymphadenopathy.  Respiratory/Thorax: Clear to auscultation bilaterally. No wheezing.   Cardiovascular: Regular rate and rhythm. No murmurs.  Gastrointestinal: Soft, nontender, nondistended. +BS   Musculoskeletal: ROM intact. No joint swelling. Normal strength   Extremities: Warm and well perfused. No peripheral edema.  Neurological: No gross neurologic deficits.   Psychological: Appropriate mood and affect.   Skin: No visible rashes or lesions.     Assessment/Plan         No red flags. Follow up in      Problem List Items Addressed This Visit    None  Visit Diagnoses       Sore throat    -  Primary    Relevant Orders    POCT Group A Streptococcus, PCR manually resulted            I have personally reviewed all available pertinent labs, imaging, and consult notes with the patient.     All questions and concerns were addressed. Patient verbalizes understanding instructions and agrees with established plan of care.     I discussed the plan with Dr.Zen Yesy Crane MD  Family medicine resident  PGY1  "

## 2024-08-15 ENCOUNTER — APPOINTMENT (OUTPATIENT)
Dept: PRIMARY CARE | Facility: CLINIC | Age: 69
End: 2024-08-15
Payer: COMMERCIAL

## 2024-08-15 VITALS
BODY MASS INDEX: 29.99 KG/M2 | WEIGHT: 180 LBS | HEART RATE: 74 BPM | HEIGHT: 65 IN | OXYGEN SATURATION: 99 % | TEMPERATURE: 98.7 F | SYSTOLIC BLOOD PRESSURE: 128 MMHG | DIASTOLIC BLOOD PRESSURE: 80 MMHG

## 2024-08-15 DIAGNOSIS — T49.6X5A: Primary | ICD-10-CM

## 2024-08-15 RX ORDER — TOBRAMYCIN AND DEXAMETHASONE 3; 1 MG/G; MG/G
0.5 OINTMENT OPHTHALMIC 3 TIMES DAILY
Qty: 8 G | Refills: 0 | Status: SHIPPED | OUTPATIENT
Start: 2024-08-15 | End: 2024-08-20

## 2024-08-15 ASSESSMENT — PATIENT HEALTH QUESTIONNAIRE - PHQ9
1. LITTLE INTEREST OR PLEASURE IN DOING THINGS: NOT AT ALL
SUM OF ALL RESPONSES TO PHQ9 QUESTIONS 1 AND 2: 0
2. FEELING DOWN, DEPRESSED OR HOPELESS: NOT AT ALL

## 2024-08-15 NOTE — PROGRESS NOTES
"Subjective   Patient ID: Fauzia Nelson is a 68 y.o. female who presents for Follow-up.  HPI  She is 68 years old female came today to the office for wax removed.  She reports feeling better after she was treated with doxycycline for sore throat.  Some upset stomach were noted with medication but she is feeling better.     Denies new onset headaches, fever, chills, n/v/d, chest pain, SOB, abdominal pain, urinary symptoms, and lower extremity edema.     Review of Systems  All other systems have been reviewed and are negative.    Visit Vitals  /80   Pulse 74   Temp 37.1 °C (98.7 °F)   Ht 1.651 m (5' 5\")   Wt 81.6 kg (180 lb)   SpO2 99%   BMI 29.95 kg/m²   Smoking Status Never   BSA 1.93 m²       Objective   Physical Exam  General: Alert and oriented. Appears well-nourished and in no acute distress.  Eyes: PERRLA. EOMI.  Ears-right tympanic membrane was not visualized due to the wax.  Left tympanic membrane was clear.   Head/neck: Normocephalic. Supple.  Lymphatics: No cervical lymphadenopathy.  Respiratory/Thorax: Clear to auscultation bilaterally. No wheezing.   Cardiovascular: Regular rate and rhythm. No murmurs.  Gastrointestinal: Soft, nontender, nondistended. +BS   Musculoskeletal: ROM intact. No joint swelling. Normal strength   Extremities: Warm and well perfused. No peripheral edema.  Neurological: No gross neurologic deficits.   Psychological: Appropriate mood and affect.   Skin: No visible rashes or lesions.     Assessment/Plan   She is 68 years old female came today to the office for earwax removal.    # Earwax  -Wax was removed with warm irrigation.  Tympanic membrane was normal color after the removal of wax.  -TobraDex was prescribed.       No red flags. Follow up in 1 year for health maintenance or sooner if needed.    Problem List Items Addressed This Visit    None  Visit Diagnoses       Adverse effect of ear wax removal preparation    -  Primary    Relevant Medications    tobramycin-dexamethasone " (Tobradex) ophthalmic ointment            I have personally reviewed all available pertinent labs, imaging, and consult notes with the patient.     All questions and concerns were addressed. Patient verbalizes understanding instructions and agrees with established plan of care.     I discussed the plan with Dr.Zen Ysey Crane MD  Family medicine resident  PGY1

## 2024-08-15 NOTE — PROGRESS NOTES
Subjective   Fauzia Nelson is a 68 y.o. female whom I am asked to see for evaluation of diminished hearing in the right ear for the past 10 day. There is not a prior history of cerumen impaction. The patient has been using ear drops to loosen wax immediately prior to this visit. The patient denies ear pain.    Objective   Auditory canal(s) of the right ear are completely obstructed with cerumen.     Cerumen was removed using gentle irrigation. Tympanic membranes are intact following the procedure. Auditory canal visualized with no active bleeding.    Assessment/Plan   Conductive hearing loss on the right side   Cerumen Impaction without otitis externa.  Care instructions given.  Home treatment:  with tobradex .  Follow-up as needed.      Problem List Items Addressed This Visit    None  Visit Diagnoses       Adverse effect of ear wax removal preparation    -  Primary    Relevant Medications    tobramycin-dexamethasone (Tobradex) ophthalmic ointment            I have personally reviewed all available pertinent labs, imaging, and consult notes with the patient.     All questions and concerns were addressed. Patient verbalizes understanding instructions and agrees with established plan of care.     I discussed the plan with Dr.Zen Yesy Crane MD  Family medicine resident  PGY3

## 2024-08-22 DIAGNOSIS — T78.40XS ALLERGY, SEQUELA: ICD-10-CM

## 2024-08-22 DIAGNOSIS — J30.2 SEASONAL ALLERGIES: ICD-10-CM

## 2024-08-22 RX ORDER — AZELASTINE 1 MG/ML
2 SPRAY, METERED NASAL 2 TIMES DAILY
Qty: 30 ML | Refills: 2 | Status: SHIPPED | OUTPATIENT
Start: 2024-08-22

## 2024-09-04 ENCOUNTER — OFFICE VISIT (OUTPATIENT)
Dept: GASTROENTEROLOGY | Facility: HOSPITAL | Age: 69
End: 2024-09-04
Payer: COMMERCIAL

## 2024-09-04 VITALS
TEMPERATURE: 97.4 F | SYSTOLIC BLOOD PRESSURE: 111 MMHG | DIASTOLIC BLOOD PRESSURE: 69 MMHG | WEIGHT: 190 LBS | BODY MASS INDEX: 31.62 KG/M2 | HEART RATE: 74 BPM | OXYGEN SATURATION: 95 %

## 2024-09-04 DIAGNOSIS — K75.81 NASH (NONALCOHOLIC STEATOHEPATITIS): Primary | ICD-10-CM

## 2024-09-04 PROCEDURE — 1126F AMNT PAIN NOTED NONE PRSNT: CPT | Performed by: STUDENT IN AN ORGANIZED HEALTH CARE EDUCATION/TRAINING PROGRAM

## 2024-09-04 PROCEDURE — 1036F TOBACCO NON-USER: CPT | Performed by: STUDENT IN AN ORGANIZED HEALTH CARE EDUCATION/TRAINING PROGRAM

## 2024-09-04 PROCEDURE — 1159F MED LIST DOCD IN RCRD: CPT | Performed by: STUDENT IN AN ORGANIZED HEALTH CARE EDUCATION/TRAINING PROGRAM

## 2024-09-04 PROCEDURE — 99213 OFFICE O/P EST LOW 20 MIN: CPT | Mod: GC | Performed by: STUDENT IN AN ORGANIZED HEALTH CARE EDUCATION/TRAINING PROGRAM

## 2024-09-04 PROCEDURE — 99213 OFFICE O/P EST LOW 20 MIN: CPT | Performed by: STUDENT IN AN ORGANIZED HEALTH CARE EDUCATION/TRAINING PROGRAM

## 2024-09-04 SDOH — ECONOMIC STABILITY: FOOD INSECURITY: WITHIN THE PAST 12 MONTHS, THE FOOD YOU BOUGHT JUST DIDN'T LAST AND YOU DIDN'T HAVE MONEY TO GET MORE.: NEVER TRUE

## 2024-09-04 SDOH — ECONOMIC STABILITY: FOOD INSECURITY: WITHIN THE PAST 12 MONTHS, YOU WORRIED THAT YOUR FOOD WOULD RUN OUT BEFORE YOU GOT MONEY TO BUY MORE.: NEVER TRUE

## 2024-09-04 ASSESSMENT — LIFESTYLE VARIABLES
AUDIT-C TOTAL SCORE: 0
HOW OFTEN DO YOU HAVE SIX OR MORE DRINKS ON ONE OCCASION: NEVER
HOW MANY STANDARD DRINKS CONTAINING ALCOHOL DO YOU HAVE ON A TYPICAL DAY: PATIENT DOES NOT DRINK
SKIP TO QUESTIONS 9-10: 1
HOW OFTEN DO YOU HAVE A DRINK CONTAINING ALCOHOL: NEVER

## 2024-09-04 ASSESSMENT — PAIN SCALES - GENERAL: PAINLEVEL: 0-NO PAIN

## 2024-09-04 ASSESSMENT — PATIENT HEALTH QUESTIONNAIRE - PHQ9
1. LITTLE INTEREST OR PLEASURE IN DOING THINGS: NOT AT ALL
2. FEELING DOWN, DEPRESSED OR HOPELESS: NOT AT ALL
SUM OF ALL RESPONSES TO PHQ9 QUESTIONS 1 & 2: 0

## 2024-09-04 NOTE — PROGRESS NOTES
Hepatology: Follow up Office Note     Patient: Fauzia Nelson, a 68 y.o. year old female presents for follow up of elevated liver enzymes and steatotic liver disease.   PCP: No primary care provider on file.    History of Present Illness   PMH: Hypertension, dyslipidemia, hypothyroidism.     Prior visit hx: Has had history of elevated LFTs for years and reports having prior follow up at [facility]. She switched insurance providers and as a result if switching health care providers as well. Initially there was a debate about her diagnosis between HANCOCK versus autoimmune hepatitis given positive VANESSA. She reportedly had improvement of liver enzymes on a prednisone trial and was subsequently transitioned to Imuran. Imuran however was later discontinued. She underwent a liver biopsy last year after she was off imuran. She notes being diagnosed with stage 2 fatty liver disease.      Today's visit:  She is feeling well and has no complaints. She recently completed two courses of doxycycline for URI. She denies abdominal pain, change in bowel movements, melena, hematochezia, nausea, vomiting, fever, chills, jaundice or new skin lesions. Weight stable over the last 6 months, but is 20lbs away from her goal/baseline.  She has been trying to walk more frequently and intends to start working out more frequently.      PSH: Denies intra-abdominal surgical history  Social history: Denies history of alcohol, illicit substance or smoking.  Family history: No known history of chronic liver disease or hepatobiliary malignancy.  Medications: noted and reviewed    Review of Systems   Constitutional/ General: No fever, no night sweats, no weight loss  Eyes: no yellow discoloration  ENT: normal   Cardiovascular: no chest pain, no palpitations  Respiratory: no shortness of breath  Gastrointestinal: denies abdominal pain, nausea, vomiting  Integumentary: no rashes, no yellow discoloration of skin  Neurologic: no weakness or numbness of  extremities  Psychiatric: no mood fluctuations  Musculoskeletal: no joint swelling   Genitourinary: no dysuria, no hematuria    All other systems have been reviewed and are negative except as noted in the HPI and above.    Medications     Current Outpatient Medications   Medication Instructions    azelastine (Astelin) 137 mcg (0.1 %) nasal spray 2 sprays, Each Nostril, 2 times daily, Use in each nostril as directed    escitalopram (LEXAPRO) 10 mg, oral, Daily    fluticasone (Flonase) 50 mcg/actuation nasal spray USE 1 TO 2 SPRAYS IN EACH NOSTRIL ONE TIME DAILY    levothyroxine (SYNTHROID, LEVOXYL) 50 mcg, oral, Daily    losartan (COZAAR) 25 mg, oral, Daily    montelukast (SINGULAIR) 10 mg, oral, Daily    pantoprazole (PROTONIX) 40 mg, oral, Daily    verapamil SR (CALAN-SR) 240 mg, oral, Daily         Physical Examination     Vitals:    09/04/24 1530   BP: 111/69   Pulse: 74   Temp: 36.3 °C (97.4 °F)   SpO2: 95%     Vitals:    09/04/24 1530   Weight: 86.2 kg (190 lb)     Body mass index is 31.62 kg/m².    Constitutional: awake, alert   Eyes: EOMI, anicteric sclera  ENT: no oropharyngeal lesions visualized  Respiratory: Bilateral air entry equal no wheezing  Cardiovascular: regular rate and rhythm, no lower extremity edema  Abdomen: soft, non tender, non distended, abdominal adiposity +, bowel sounds present  Neurologic: gross motor strength intact  Psychiatric: alert, appropriate mood and affect, oriented to time/place/person    Labs   July 2024:   ALT 55, AST 34, ALP 71, Bili 0.8. Hb 15.7. ELF 9.40   March 2024: ALT 65, AST 42  Dec 2023: , AST 62, T bili 0.6, ALP 77  June 2023: ALT 79, AST 49, ALP 64, Bili 0.7, INR 1.1, Hb 15.9, WBC 5.7, Plt 261, ELF 9.51 (low)    Lab Results   Component Value Date     03/02/2021    K 3.3 (L) 03/02/2021    CREATININE 0.98 03/02/2021    ALBUMIN 4.8 03/02/2021    ALT 72 (H) 03/02/2021    AST 43 (H) 03/02/2021    ALKPHOS 60 03/02/2021    INR 1.0 03/02/2021    HGB 16.3 (H)  03/02/2021     03/02/2021      Imaging   Fibroscan March 2024: E 7.7 kPa, IQR 17%, . F1, S1.     US liver June 2023: Hepatic steatosis.     HIE records  Labs: October/Sept 2022: INR 1.14, platelets 209, hemoglobin 15.1, sodium 136, creatinine 0.99, , AST 82, ALP 63, bilirubin 0.8, TSH 3.04  July 2021: AFP 2.3, ALT 77, AST 48, ALP 57, bilirubin 0.9  August 2020: ASMA negative, HCV antibody nonreactive, IgG 1270, VANESSA negative AMA negative, anti-LKM neg  2017: Ceruloplasmin 24, SSA antibody positive, ferritin 170, iron 70, percent saturation 25, A1 AT 83/low, hep B surface antibody 16.5, hep B surface antigen nonreactive, hep A total antibody nonreactive, hep B core total antibody nonreactive, hep C antibody nonreactive     October 2022: Ultrasound-guided liver biopsy: [facility]  Imaging:/Gallbladder/pancreas: July 2021: Mild hepatic steatosis, no focal suspicious hepatic lesion     Office note November 14, 2022: She presented for follow-up visit for management of HANCOCK. Liver biopsy was done in October 2022. She underwent a prednisone trial in the past and had VANESSA elevation of 1: 320 in the past. She was prescribed Imuran and advised to continue until further work-up. Subsequently Imuran was discontinued and autoimmune labs were negative.  Liver biopsy to stage fibrosis suspicious for HANCOCK with grade 1B fibrosis. Metabolic risk factors include obesity, dyslipidemia, hypertension, A1c normal, lipid panel only slightly abnormal, hypertension controlled. Continues to work on losing weight.  July 2021: FibroScan minimal fibrosis F2 with grade 3 steatosis. LSM 7.7K PA.     Liver biopsy report as documented in the HIE note: Moderate macrovesicular steatosis involving 50% hepatocytes with moderate lobular inflammation, ballooning degeneration hepatocytes and mild periportal and sinusoidal fibrosis. Fibrosis stage Ib, zone 3/perisinusoidal. Comment: Overall features are suspicious for HANCOCK.    Assessment  and Plan    Fauzia FAY Nelson, a 68 y.o. year old female presents for follow up of HANCOCK/MASH. I have reviewed pertinent provider notes, labs and imaging studies. Discussed results and their interpretation with the patient today.    Encounter Diagnosis   Name Primary?    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   [Based on review of records available through [facility] at the time of her initial visit: she has been diagnosed with HANCOCK with stage 1b fibrosis on liver biopsy in Oct 2022. She has had ALT and AST elevations up to 4-6 xULN. Prior to this, it was debated if she had a component of autoimmune hepatitis given positive VANESSA and a response to prednisone trial (based on chart documentation). The liver biopsy based on the report documented does not suggest feature of autoimmune liver ds.]  Cardio metabolic risk factors present: HTN, dyslipidemia, abdominal adiposity, elevated BMI.        -Based on NIT with VCE and ELF- estimated stage 1 disease for MASLD/MASH. In addition LFT elevations have over time improved. At this time, not a candidate for consideration of pharmacotherapy with Resmetirom.     - Plan for hepatic function panel every 6 months  - Repeat ELF in 1 year prior to visit   - Plan to follow up in 1 year  - She has seen a nutritionist in July 2023.   - Pt advised to continue to avoid hepatotoxic agents.      Follow up in 1 year.

## 2024-09-04 NOTE — PATIENT INSTRUCTIONS
Fauzia Nelson,     Thank you for coming in for your hepatology office visit today. As per our discussion, I recommend:     Have labs done in 6 months.    Your results are suggesting stage 1 fibrosis based on blood work and fibroscan this year. We can continue to intermittently monitor this based on your blood work.     I would like to see you back in the office in 1 year.  If you are not provided with a date for your follow up visit at the end of your encounter today at the check out desk, I would encourage you to call [phone] to schedule your appointment with me.   If you have any trouble or need assistance with having this done, please reach out to my 's office at [phone] (Ms Joseline Mccloud) or my nurse coordinator at [phone] (Ms Glenis DAVIS).     I look forward to seeing you again. Thank you for allowing me to participate in your care.     Sincerely,  Fabiano Flores MD  Hepatology